# Patient Record
Sex: MALE | URBAN - METROPOLITAN AREA
[De-identification: names, ages, dates, MRNs, and addresses within clinical notes are randomized per-mention and may not be internally consistent; named-entity substitution may affect disease eponyms.]

---

## 2022-11-08 ENCOUNTER — APPOINTMENT (OUTPATIENT)
Dept: URBAN - METROPOLITAN AREA SURGERY 18 | Age: 67
Setting detail: DERMATOLOGY
End: 2022-11-14

## 2022-11-08 DIAGNOSIS — L30.9 DERMATITIS, UNSPECIFIED: ICD-10-CM

## 2022-11-08 DIAGNOSIS — L81.8 OTHER SPECIFIED DISORDERS OF PIGMENTATION: ICD-10-CM

## 2022-11-08 PROCEDURE — OTHER PRESCRIPTION: OTHER

## 2022-11-08 PROCEDURE — 87220 TISSUE EXAM FOR FUNGI: CPT

## 2022-11-08 PROCEDURE — OTHER COUNSELING: OTHER

## 2022-11-08 PROCEDURE — OTHER MEDICATION COUNSELING: OTHER

## 2022-11-08 PROCEDURE — OTHER TREATMENT REGIMEN: OTHER

## 2022-11-08 PROCEDURE — OTHER SCABIES PREP: OTHER

## 2022-11-08 PROCEDURE — 99203 OFFICE O/P NEW LOW 30 MIN: CPT

## 2022-11-08 RX ORDER — CLOBETASOL PROPIONATE 0.5 MG/G
CREAM TOPICAL BID
Qty: 60 | Refills: 0 | Status: ERX | COMMUNITY
Start: 2022-11-08

## 2022-11-08 ASSESSMENT — LOCATION SIMPLE DESCRIPTION DERM
LOCATION SIMPLE: LEFT FOREARM
LOCATION SIMPLE: RIGHT FOREARM
LOCATION SIMPLE: LEFT UPPER ARM

## 2022-11-08 ASSESSMENT — LOCATION ZONE DERM: LOCATION ZONE: ARM

## 2022-11-08 ASSESSMENT — LOCATION DETAILED DESCRIPTION DERM
LOCATION DETAILED: LEFT ANTERIOR DISTAL UPPER ARM
LOCATION DETAILED: RIGHT VENTRAL DISTAL FOREARM
LOCATION DETAILED: LEFT VENTRAL DISTAL FOREARM

## 2022-11-08 NOTE — PROCEDURE: TREATMENT REGIMEN
Plan: Very mild today.  Possibly ACD.  Declined f/u apt for rash but if it does not resolve in 2-3 weeks then return for evaluation.  He verbalized understanding.
Discontinue Regimen: all other topicals
Detail Level: Zone
Initiate Treatment: Clobetasol

## 2022-11-08 NOTE — PROCEDURE: MEDICATION COUNSELING
negative Vtama Pregnancy And Lactation Text: It is unknown if this medication can cause problems during pregnancy and breastfeeding.

## 2022-11-08 NOTE — HPI: RASH
What Type Of Note Output Would You Prefer (Optional)?: Standard Output
Is The Patient Presenting As Previously Scheduled?: Yes
How Severe Is Your Rash?: moderate
Is This A New Presentation, Or A Follow-Up?: Rash
Additional History: Patient states rash comes and goes. Patient states he was treated with doxycycline 100mg daily for about two weeks and his last use was approximately three weeks ago. He has currently been using ciclopirox 0.77% cream.

## 2022-11-08 NOTE — PROCEDURE: MEDICATION COUNSELING
--- Terbinafine Pregnancy And Lactation Text: This medication is Pregnancy Category B and is considered safe during pregnancy. It is also excreted in breast milk and breast feeding isn't recommended.

## 2022-11-08 NOTE — PROCEDURE: MEDICATION COUNSELING
Has Your Skin Lesion Been Treated?: not been treated Is This A New Presentation, Or A Follow-Up?: Subcutaneous Nodule Additional History: Patient states it was inflamed last weekend but has [employer] Counseling:  I discussed with the patient the risks of mycophenolate mofetil including but not limited to infection/immunosuppression, GI upset, hypokalemia, hypercholesterolemia, bone marrow suppression, lymphoproliferative disorders, malignancy, GI ulceration/bleed/perforation, colitis, interstitial lung disease, kidney failure, progressive multifocal leukoencephalopathy, and birth defects.  The patient understands that monitoring is required including a baseline creatinine and regular CBC testing. In addition, patient must alert us immediately if symptoms of infection or other concerning signs are noted.

## 2025-01-25 NOTE — PROCEDURE: MEDICATION COUNSELING
Physical Therapy      Patient Name:  Agusto Ma   MRN:  [id number]    Mirvaso Counseling: Mirvaso is a topical medication which can decrease superficial blood flow where applied. Side effects are uncommon and include stinging, redness and allergic reactions.